# Patient Record
Sex: MALE | Race: WHITE | Employment: FULL TIME | URBAN - NONMETROPOLITAN AREA
[De-identification: names, ages, dates, MRNs, and addresses within clinical notes are randomized per-mention and may not be internally consistent; named-entity substitution may affect disease eponyms.]

---

## 2018-01-05 ENCOUNTER — OFFICE VISIT (OUTPATIENT)
Dept: FAMILY MEDICINE CLINIC | Age: 28
End: 2018-01-05
Payer: COMMERCIAL

## 2018-01-05 VITALS
BODY MASS INDEX: 39.54 KG/M2 | HEART RATE: 80 BPM | OXYGEN SATURATION: 98 % | HEIGHT: 70 IN | DIASTOLIC BLOOD PRESSURE: 100 MMHG | WEIGHT: 276.2 LBS | SYSTOLIC BLOOD PRESSURE: 142 MMHG | RESPIRATION RATE: 12 BRPM

## 2018-01-05 DIAGNOSIS — M25.512 CHRONIC LEFT SHOULDER PAIN: ICD-10-CM

## 2018-01-05 DIAGNOSIS — G89.29 CHRONIC LEFT SHOULDER PAIN: ICD-10-CM

## 2018-01-05 DIAGNOSIS — E66.9 OBESITY (BMI 35.0-39.9 WITHOUT COMORBIDITY): ICD-10-CM

## 2018-01-05 DIAGNOSIS — R53.83 FATIGUE, UNSPECIFIED TYPE: ICD-10-CM

## 2018-01-05 DIAGNOSIS — R03.0 ELEVATED BLOOD-PRESSURE READING WITHOUT DIAGNOSIS OF HYPERTENSION: ICD-10-CM

## 2018-01-05 DIAGNOSIS — R00.2 PALPITATIONS: Primary | ICD-10-CM

## 2018-01-05 DIAGNOSIS — L73.9 FOLLICULITIS: ICD-10-CM

## 2018-01-05 DIAGNOSIS — Z72.0 TOBACCO ABUSE: ICD-10-CM

## 2018-01-05 PROCEDURE — 36415 COLL VENOUS BLD VENIPUNCTURE: CPT | Performed by: NURSE PRACTITIONER

## 2018-01-05 PROCEDURE — 99205 OFFICE O/P NEW HI 60 MIN: CPT | Performed by: NURSE PRACTITIONER

## 2018-01-05 PROCEDURE — 93000 ELECTROCARDIOGRAM COMPLETE: CPT | Performed by: NURSE PRACTITIONER

## 2018-01-05 RX ORDER — METHYLPREDNISOLONE 4 MG/1
TABLET ORAL
Qty: 1 KIT | Refills: 0 | Status: SHIPPED | OUTPATIENT
Start: 2018-01-05 | End: 2018-01-11

## 2018-01-05 RX ORDER — CEPHALEXIN 500 MG/1
500 CAPSULE ORAL 3 TIMES DAILY
Qty: 21 CAPSULE | Refills: 0 | Status: SHIPPED | OUTPATIENT
Start: 2018-01-05 | End: 2018-01-12

## 2018-01-05 ASSESSMENT — PATIENT HEALTH QUESTIONNAIRE - PHQ9
SUM OF ALL RESPONSES TO PHQ QUESTIONS 1-9: 0
SUM OF ALL RESPONSES TO PHQ9 QUESTIONS 1 & 2: 0
2. FEELING DOWN, DEPRESSED OR HOPELESS: 0
1. LITTLE INTEREST OR PLEASURE IN DOING THINGS: 0

## 2018-01-05 ASSESSMENT — ENCOUNTER SYMPTOMS
VISUAL CHANGE: 0
VOMITING: 0
SWOLLEN GLANDS: 0
SORE THROAT: 0
NAUSEA: 0
ABDOMINAL PAIN: 0
COUGH: 0

## 2018-01-05 NOTE — PATIENT INSTRUCTIONS
Patient Education        Body Mass Index: Care Instructions  Your Care Instructions    Body mass index (BMI) can help you see if your weight is raising your risk for health problems. It uses a formula to compare how much you weigh with how tall you are. · A BMI lower than 18.5 is considered underweight. · A BMI between 18.5 and 24.9 is considered healthy. · A BMI between 25 and 29.9 is considered overweight. A BMI of 30 or higher is considered obese. If your BMI is in the normal range, it means that you have a lower risk for weight-related health problems. If your BMI is in the overweight or obese range, you may be at increased risk for weight-related health problems, such as high blood pressure, heart disease, stroke, arthritis or joint pain, and diabetes. If your BMI is in the underweight range, you may be at increased risk for health problems such as fatigue, lower protection (immunity) against illness, muscle loss, bone loss, hair loss, and hormone problems. BMI is just one measure of your risk for weight-related health problems. You may be at higher risk for health problems if you are not active, you eat an unhealthy diet, or you drink too much alcohol or use tobacco products. Follow-up care is a key part of your treatment and safety. Be sure to make and go to all appointments, and call your doctor if you are having problems. It's also a good idea to know your test results and keep a list of the medicines you take. How can you care for yourself at home? · Practice healthy eating habits. This includes eating plenty of fruits, vegetables, whole grains, lean protein, and low-fat dairy. · If your doctor recommends it, get more exercise. Walking is a good choice. Bit by bit, increase the amount you walk every day. Try for at least 30 minutes on most days of the week. · Do not smoke. Smoking can increase your risk for health problems.  If you need help quitting, talk to your doctor about stop-smoking

## 2018-01-05 NOTE — PROGRESS NOTES
[facility]  [facility]  [address] [place] [address]  Dept: [phone]  Dept Fax: [phone]  Loc: [phone]    Sameera Santana is a 32 y.o. male who presents today for his medical conditions/complaints as noted below. Chief Complaint   [facility]   [facility]    Shoulder Problem     L numbness x 3 months and getting worse    Fatigue    Chest Pain     x 3 months       HPI:     Does not know father. Early family history of CAD. Brother  of a stroke in his early 45s. He had MIs prior to this. Brother states that he was not Close with his brother because there is a 20 years difference so is unsure if he had any kind of drug use. Increased BMI. Does do a lot of lifting at work. Does not follow a certain diet or exercise routine. Is . States a year or two ago they started a weight loss program, but then stopped once it got cold out. Elevated  BP reading. States that he was first told this when he was 13. He does take his BP routinely. Gets 5-6 hours of sleep per night. .st      Shoulder Problem   This is a new problem. The current episode started more than 1 month ago. The problem occurs constantly. The problem has been waxing and waning. Associated symptoms include arthralgias, chest pain, fatigue, myalgias, numbness, vertigo and weakness. Pertinent negatives include no abdominal pain, anorexia, chills, congestion, coughing, diaphoresis, fever, headaches, joint swelling, nausea, neck pain, rash, sore throat, swollen glands, urinary symptoms, visual change or vomiting. Fatigue   Associated symptoms include arthralgias, chest pain, fatigue, myalgias, numbness, vertigo and weakness. Pertinent negatives include no abdominal pain, anorexia, chills, congestion, coughing, diaphoresis, fever, headaches, joint swelling, nausea, neck pain, rash, sore throat, swollen glands, urinary symptoms, visual change or vomiting.    Chest Pain Associated symptoms include numbness, palpitations and weakness. Pertinent negatives include no abdominal pain, cough, diaphoresis, fever, headaches, nausea or vomiting. Palpitations    This is a new problem. The current episode started more than 1 month ago. The problem occurs 2 to 4 times per day. The problem has been unchanged. Nothing (drinks one soda, and one coffee per day, denies feeling stressed ) aggravates the symptoms. Associated symptoms include chest pain, numbness and weakness. Pertinent negatives include no coughing, diaphoresis, fever, nausea or vomiting. Medications:    Current Outpatient Prescriptions:     methylPREDNISolone (MEDROL DOSEPACK) 4 MG tablet, Take by mouth., Disp: 1 kit, Rfl: 0    The patient is allergic to codeine. Past Medical History  Josef Lehman  has no past medical history on file. Past Surgical History  The patient  has a past surgical history that includes Leg Surgery (Right). Family History  This patient's family history includes Cancer in his father; Heart Disease in his brother, father, and mother; Other in his mother; Stroke in his brother and sister. Social History  Josef Lehman  reports that he has been smoking. He has never used smokeless tobacco. He reports that he does not drink alcohol or use drugs. Health Maintenance  Health Maintenance Due   Topic Date Due    HIV screen  12/16/2003    DTaP/Tdap/Td vaccine (1 - Tdap) 12/16/2007       Subjective:      Review of Systems   Constitutional: Positive for fatigue. Negative for chills, diaphoresis and fever. HENT: Negative for congestion and sore throat. Respiratory: Negative for cough. Cardiovascular: Positive for chest pain and palpitations. Gastrointestinal: Negative for abdominal pain, anorexia, nausea and vomiting. Musculoskeletal: Positive for arthralgias and myalgias. Negative for joint swelling and neck pain. Skin: Negative for rash.    Neurological: Positive for vertigo, weakness and

## 2018-01-08 ENCOUNTER — TELEPHONE (OUTPATIENT)
Dept: FAMILY MEDICINE CLINIC | Age: 28
End: 2018-01-08

## 2018-01-08 DIAGNOSIS — E78.6 LOW HDL (UNDER 40): Primary | ICD-10-CM

## 2018-01-08 DIAGNOSIS — E78.1 HIGH TRIGLYCERIDES: ICD-10-CM

## 2018-01-08 DIAGNOSIS — E88.81 DYSMETABOLIC SYNDROME: ICD-10-CM

## 2018-01-08 DIAGNOSIS — E55.9 VITAMIN D DEFICIENCY: ICD-10-CM

## 2018-01-08 RX ORDER — ERGOCALCIFEROL 1.25 MG/1
50000 CAPSULE ORAL WEEKLY
Qty: 12 CAPSULE | Refills: 0 | Status: SHIPPED | OUTPATIENT
Start: 2018-01-08

## 2018-01-08 RX ORDER — SIMVASTATIN 5 MG
5 TABLET ORAL NIGHTLY
Qty: 30 TABLET | Refills: 2 | Status: SHIPPED | OUTPATIENT
Start: 2018-01-08 | End: 2018-01-15 | Stop reason: SINTOL

## 2018-01-09 ENCOUNTER — HOSPITAL ENCOUNTER (OUTPATIENT)
Dept: NON INVASIVE DIAGNOSTICS | Age: 28
Discharge: HOME | End: 2018-01-09
Payer: COMMERCIAL

## 2018-01-09 DIAGNOSIS — R00.2 PALPITATIONS: ICD-10-CM

## 2018-01-09 PROCEDURE — 93226 XTRNL ECG REC<48 HR SCAN A/R: CPT

## 2018-01-09 PROCEDURE — 93225 XTRNL ECG REC<48 HRS REC: CPT

## 2018-01-15 ENCOUNTER — OFFICE VISIT (OUTPATIENT)
Dept: FAMILY MEDICINE CLINIC | Age: 28
End: 2018-01-15
Payer: COMMERCIAL

## 2018-01-15 VITALS
HEART RATE: 81 BPM | OXYGEN SATURATION: 97 % | WEIGHT: 276.2 LBS | DIASTOLIC BLOOD PRESSURE: 88 MMHG | RESPIRATION RATE: 16 BRPM | BODY MASS INDEX: 39.59 KG/M2 | SYSTOLIC BLOOD PRESSURE: 130 MMHG

## 2018-01-15 DIAGNOSIS — R00.2 PALPITATIONS: Primary | ICD-10-CM

## 2018-01-15 DIAGNOSIS — R20.0 NUMBNESS AND TINGLING IN LEFT ARM: ICD-10-CM

## 2018-01-15 DIAGNOSIS — E66.9 OBESITY (BMI 35.0-39.9 WITHOUT COMORBIDITY): ICD-10-CM

## 2018-01-15 DIAGNOSIS — E88.81 DYSMETABOLIC SYNDROME: ICD-10-CM

## 2018-01-15 DIAGNOSIS — E78.1 HIGH TRIGLYCERIDES: ICD-10-CM

## 2018-01-15 DIAGNOSIS — M25.512 ACUTE PAIN OF LEFT SHOULDER: ICD-10-CM

## 2018-01-15 DIAGNOSIS — Z23 NEED FOR PROPHYLACTIC VACCINATION AGAINST STREPTOCOCCUS PNEUMONIAE (PNEUMOCOCCUS): ICD-10-CM

## 2018-01-15 DIAGNOSIS — R20.2 NUMBNESS AND TINGLING IN LEFT ARM: ICD-10-CM

## 2018-01-15 DIAGNOSIS — Z82.49 FAMILY HISTORY OF EARLY CAD: ICD-10-CM

## 2018-01-15 DIAGNOSIS — Z72.0 TOBACCO ABUSE: ICD-10-CM

## 2018-01-15 PROCEDURE — 90732 PPSV23 VACC 2 YRS+ SUBQ/IM: CPT | Performed by: NURSE PRACTITIONER

## 2018-01-15 PROCEDURE — 90471 IMMUNIZATION ADMIN: CPT | Performed by: NURSE PRACTITIONER

## 2018-01-15 PROCEDURE — 99214 OFFICE O/P EST MOD 30 MIN: CPT | Performed by: NURSE PRACTITIONER

## 2018-01-22 ENCOUNTER — PROCEDURE VISIT (OUTPATIENT)
Dept: NEUROLOGY | Age: 28
End: 2018-01-22
Payer: COMMERCIAL

## 2018-01-22 DIAGNOSIS — R20.0 LEFT ARM NUMBNESS: ICD-10-CM

## 2018-01-22 DIAGNOSIS — M79.602 LEFT ARM PAIN: ICD-10-CM

## 2018-01-22 DIAGNOSIS — M54.12 CERVICAL RADICULOPATHY: Primary | ICD-10-CM

## 2018-01-22 PROCEDURE — 95909 NRV CNDJ TST 5-6 STUDIES: CPT | Performed by: PSYCHIATRY & NEUROLOGY

## 2018-01-22 PROCEDURE — 95886 MUSC TEST DONE W/N TEST COMP: CPT | Performed by: PSYCHIATRY & NEUROLOGY

## 2018-01-23 ASSESSMENT — ENCOUNTER SYMPTOMS
VOMITING: 0
SORE THROAT: 0
SWOLLEN GLANDS: 0
VISUAL CHANGE: 0
COUGH: 0
NAUSEA: 0
ABDOMINAL PAIN: 0

## 2018-01-23 NOTE — PROGRESS NOTES
symptoms, visual change or vomiting. Chest Pain    Associated symptoms include numbness, palpitations and weakness. Pertinent negatives include no abdominal pain, cough, diaphoresis, fever, headaches, nausea or vomiting. Palpitations    This is a new problem. The current episode started more than 1 month ago. The problem occurs 2 to 4 times per day. The problem has been unchanged. Nothing (drinks one soda, and one coffee per day, denies feeling stressed ) aggravates the symptoms. Associated symptoms include chest pain, numbness and weakness. Pertinent negatives include no coughing, diaphoresis, fever, nausea or vomiting. Medications:    Current Outpatient Prescriptions:     vitamin D (ERGOCALCIFEROL) 22989 units CAPS capsule, Take 1 capsule by mouth once a week, Disp: 12 capsule, Rfl: 0    The patient is allergic to codeine. Past Medical History  Carol Hensley  has no past medical history on file. Past Surgical History  The patient  has a past surgical history that includes Leg Surgery (Right). Family History  This patient's family history includes Cancer in his father; Heart Disease in his brother, father, and mother; Other in his mother; Stroke in his brother and sister. Social History  Carol Hensley  reports that he has been smoking. He has never used smokeless tobacco. He reports that he does not drink alcohol or use drugs. Health Maintenance  Health Maintenance Due   Topic Date Due    HIV screen  12/16/2003    DTaP/Tdap/Td vaccine (1 - Tdap) 12/16/2007       Subjective:      Review of Systems   Constitutional: Positive for fatigue. Negative for chills, diaphoresis and fever. HENT: Negative for congestion and sore throat. Respiratory: Negative for cough. Cardiovascular: Positive for chest pain and palpitations. Gastrointestinal: Negative for abdominal pain, anorexia, nausea and vomiting. Musculoskeletal: Positive for arthralgias and myalgias. Negative for joint swelling and neck pain. Skin: Negative for rash. Neurological: Positive for vertigo, weakness and numbness. Negative for headaches. Objective:     /88   Pulse 81   Resp 16   Wt 276 lb 3.2 oz (125.3 kg)   SpO2 97%   BMI 39.59 kg/m²     Physical Exam   Constitutional: He is oriented to person, place, and time. He appears well-developed and well-nourished. HENT:   Head: Normocephalic and atraumatic. Right Ear: External ear normal.   Left Ear: External ear normal.   Eyes: Conjunctivae and EOM are normal. Pupils are equal, round, and reactive to light. Right eye exhibits no discharge. Left eye exhibits no discharge. Neck: Trachea normal and normal range of motion. Neck supple. No spinous process tenderness present. No thyromegaly present. Cardiovascular: Normal rate, regular rhythm and normal heart sounds. Exam reveals no gallop and no friction rub. No murmur heard. Pulses:       Radial pulses are 2+ on the right side, and 2+ on the left side. Pulmonary/Chest: Effort normal and breath sounds normal.   Abdominal: Soft. Bowel sounds are normal. There is no tenderness. Musculoskeletal: Normal range of motion. Left shoulder: Normal.   Neurological: He is alert and oriented to person, place, and time. No cranial nerve deficit (3-12). Coordination and gait normal.   Skin: Skin is warm and dry. No rash noted. No erythema. Psychiatric: He has a normal mood and affect. His speech is normal and behavior is normal. Thought content normal.   Vitals reviewed.     Lab Results   Component Value Date     01/05/2018    K 4.5 01/05/2018     01/05/2018    CO2 24 01/05/2018    BUN 14 01/05/2018    CREATININE 0.8 01/05/2018    CALCIUM 9.8 01/05/2018    PROT 7.1 01/05/2018    LABALBU 4.5 01/05/2018    BILITOT 0.3 01/05/2018    ALKPHOS 60 01/05/2018    AST 22 01/05/2018    ALT 35 01/05/2018    LABGLOM >90 01/05/2018       Lab Results   Component Value Date    WBC 8.8 01/05/2018    HGB 16.5 01/05/2018    HCT 48.1 01/05/2018    MCV 87.7 01/05/2018     01/05/2018     Lab Results   Component Value Date    TSH 2.520 01/05/2018       Assessment/Plan:     Grisel Bernal was seen today for check-up and shoulder pain. Diagnoses and all orders for this visit:    Palpitations  -     (Gxt) Stress Test Exercise W Out Myoview; Future  -     Echocardiogram complete; Future    Obesity (BMI 35.0-39.9 without comorbidity)  -     (Gxt) Stress Test Exercise W Out Myoview; Future  -     Echocardiogram complete; Future    Dysmetabolic syndrome  -     (Gxt) Stress Test Exercise W Out Myoview; Future  -     Echocardiogram complete; Future    High triglycerides  -     (Gxt) Stress Test Exercise W Out Myoview; Future  -     Echocardiogram complete; Future    Tobacco abuse  -     (Gxt) Stress Test Exercise W Out Myoview; Future  -     Echocardiogram complete; Future    Family history of early CAD  -     (Gxt) Stress Test Exercise W Out Myoview; Future  -     Echocardiogram complete; Future    Need for prophylactic vaccination against Streptococcus pneumoniae (pneumococcus)  -     Pneumococcal polysaccharide vaccine 23-valent greater than or equal to 1yo subcutaneous/IM    Acute pain of left shoulder  -     XR SHOULDER LEFT (MIN 2 VIEWS); Future  -     XR SHOULDER LEFT (MIN 2 VIEWS)    Numbness and tingling in left arm  -     EMG; Future    If symptoms worsen advised to make sooner apt  Holter monitor reviewed with patient. Return in about 2 weeks (around 1/29/2018) for tobacco cessation, diagnostic fu, left shoulder pain. Discussed use, benefit, and side effects of prescribed medications. Barriers to medication compliance addressed. All patient questions answered. Pt voiced understanding.      Electronically signed by Lexie Riley CNP on 1/23/2018 at 8:42 AM

## 2018-01-24 ENCOUNTER — HOSPITAL ENCOUNTER (OUTPATIENT)
Dept: NON INVASIVE DIAGNOSTICS | Age: 28
Discharge: HOME | End: 2018-01-24

## 2018-01-24 DIAGNOSIS — E78.1 HIGH TRIGLYCERIDES: ICD-10-CM

## 2018-01-24 DIAGNOSIS — Z82.49 FAMILY HISTORY OF EARLY CAD: ICD-10-CM

## 2018-01-24 DIAGNOSIS — E66.9 OBESITY (BMI 35.0-39.9 WITHOUT COMORBIDITY): ICD-10-CM

## 2018-01-24 DIAGNOSIS — Z72.0 TOBACCO ABUSE: ICD-10-CM

## 2018-01-24 DIAGNOSIS — R00.2 PALPITATIONS: ICD-10-CM

## 2018-01-24 DIAGNOSIS — E88.81 DYSMETABOLIC SYNDROME: ICD-10-CM

## 2018-01-24 LAB
LV EF: 60 %
LVEF MODALITY: NORMAL

## 2018-01-24 PROCEDURE — 93306 TTE W/DOPPLER COMPLETE: CPT

## 2018-01-24 PROCEDURE — 93017 CV STRESS TEST TRACING ONLY: CPT

## 2018-01-25 DIAGNOSIS — Z82.49 FAMILY HISTORY OF EARLY CAD: Primary | ICD-10-CM

## 2018-01-25 DIAGNOSIS — R00.2 PALPITATIONS: ICD-10-CM

## 2018-01-29 ENCOUNTER — OFFICE VISIT (OUTPATIENT)
Dept: FAMILY MEDICINE CLINIC | Age: 28
End: 2018-01-29
Payer: COMMERCIAL

## 2018-01-29 VITALS
WEIGHT: 280.6 LBS | OXYGEN SATURATION: 97 % | SYSTOLIC BLOOD PRESSURE: 138 MMHG | HEART RATE: 76 BPM | BODY MASS INDEX: 40.22 KG/M2 | DIASTOLIC BLOOD PRESSURE: 74 MMHG | RESPIRATION RATE: 16 BRPM

## 2018-01-29 DIAGNOSIS — G89.29 CHRONIC LEFT SHOULDER PAIN: ICD-10-CM

## 2018-01-29 DIAGNOSIS — M25.512 CHRONIC LEFT SHOULDER PAIN: ICD-10-CM

## 2018-01-29 DIAGNOSIS — G56.02 CARPAL TUNNEL SYNDROME OF LEFT WRIST: ICD-10-CM

## 2018-01-29 DIAGNOSIS — M50.122 CERVICAL DISC DISORDER AT C5-C6 LEVEL WITH RADICULOPATHY: ICD-10-CM

## 2018-01-29 DIAGNOSIS — Z72.0 TOBACCO ABUSE: Primary | ICD-10-CM

## 2018-01-29 PROCEDURE — 99214 OFFICE O/P EST MOD 30 MIN: CPT | Performed by: NURSE PRACTITIONER

## 2018-01-29 RX ORDER — CYCLOBENZAPRINE HCL 10 MG
10 TABLET ORAL 3 TIMES DAILY PRN
Qty: 60 TABLET | Refills: 1 | Status: SHIPPED | OUTPATIENT
Start: 2018-01-29 | End: 2018-02-08

## 2018-01-29 ASSESSMENT — ENCOUNTER SYMPTOMS
NAUSEA: 0
VOMITING: 0
ABDOMINAL PAIN: 0
COUGH: 0
SORE THROAT: 0

## 2018-01-29 NOTE — PROGRESS NOTES
[facility]  [facility]  [address] [place] [address]  Dept: [phone]  Dept Fax: [phone]  Loc: [phone]    Pema Eden is a 32 y.o. male who presents today for his medical conditions/complaints as noted below. Chief Complaint   Patient presents with    Follow-up     has cut down smoking,     Shoulder Pain     discuss results from imaging, left shoulder        HPI:     Shoulder Problem   This is a new problem. The current episode started more than 1 month ago. The problem occurs constantly. The problem has been waxing and waning. Associated symptoms include arthralgias, chest pain, fatigue, myalgias, numbness, vertigo and weakness. Pertinent negatives include no abdominal pain, anorexia, chills, congestion, coughing, diaphoresis, fever, headaches, joint swelling, nausea, neck pain, rash, sore throat, swollen glands, urinary symptoms, visual change or vomiting. Did have EMG completed. Tobacco abuse. Has cut down to not smoking at all while at work. Is down to 1/4 pack a day. History of elevated BP. Palpitations. Early CAD in family. HAd echo and stress completed. Is schedule to see cardiology at the beginning of feb. Medications:    Current Outpatient Prescriptions:     cyclobenzaprine (FLEXERIL) 10 MG tablet, Take 1 tablet by mouth 3 times daily as needed for Muscle spasms, Disp: 60 tablet, Rfl: 1    vitamin D (ERGOCALCIFEROL) 47772 units CAPS capsule, Take 1 capsule by mouth once a week, Disp: 12 capsule, Rfl: 0    The patient is allergic to codeine. Past Medical History  Rashi Costa  has no past medical history on file. Past Surgical History  The patient  has a past surgical history that includes Leg Surgery (Right). Family History  This patient's family history includes Cancer in his father; Heart Disease in his brother, father, and mother; Other in his mother; Stroke in his brother and sister.     Social History  Rashi Costa  reports

## 2018-02-06 ENCOUNTER — TELEPHONE (OUTPATIENT)
Dept: CARDIOLOGY CLINIC | Age: 28
End: 2018-02-06

## 2018-02-06 ENCOUNTER — OFFICE VISIT (OUTPATIENT)
Dept: CARDIOLOGY CLINIC | Age: 28
End: 2018-02-06
Payer: COMMERCIAL

## 2018-02-06 VITALS
HEART RATE: 88 BPM | HEIGHT: 70 IN | DIASTOLIC BLOOD PRESSURE: 70 MMHG | BODY MASS INDEX: 39.43 KG/M2 | WEIGHT: 275.4 LBS | SYSTOLIC BLOOD PRESSURE: 118 MMHG

## 2018-02-06 DIAGNOSIS — R07.9 CHEST PAIN IN ADULT: Primary | ICD-10-CM

## 2018-02-06 DIAGNOSIS — E78.00 PURE HYPERCHOLESTEROLEMIA: ICD-10-CM

## 2018-02-06 DIAGNOSIS — K21.9 GASTROESOPHAGEAL REFLUX DISEASE, ESOPHAGITIS PRESENCE NOT SPECIFIED: ICD-10-CM

## 2018-02-06 DIAGNOSIS — Z82.49 FAMILY HISTORY OF PREMATURE CAD: ICD-10-CM

## 2018-02-06 DIAGNOSIS — R06.02 SOB (SHORTNESS OF BREATH) ON EXERTION: ICD-10-CM

## 2018-02-06 PROBLEM — I10 ESSENTIAL HYPERTENSION: Status: ACTIVE | Noted: 2018-02-06

## 2018-02-06 PROCEDURE — 99204 OFFICE O/P NEW MOD 45 MIN: CPT | Performed by: INTERNAL MEDICINE

## 2018-02-06 RX ORDER — ATORVASTATIN CALCIUM 10 MG/1
10 TABLET, FILM COATED ORAL DAILY
COMMUNITY
End: 2018-02-06 | Stop reason: SDUPTHER

## 2018-02-06 RX ORDER — ASPIRIN 81 MG/1
81 TABLET ORAL DAILY
Qty: 30 TABLET | Refills: 3 | COMMUNITY
Start: 2018-02-06

## 2018-02-06 RX ORDER — PANTOPRAZOLE SODIUM 40 MG/1
40 TABLET, DELAYED RELEASE ORAL DAILY
Qty: 30 TABLET | Refills: 0 | Status: SHIPPED | OUTPATIENT
Start: 2018-02-06

## 2018-02-06 RX ORDER — ATORVASTATIN CALCIUM 10 MG/1
10 TABLET, FILM COATED ORAL DAILY
Qty: 30 TABLET | Refills: 0 | Status: ON HOLD | OUTPATIENT
Start: 2018-02-06 | End: 2018-02-16

## 2018-02-06 NOTE — PROGRESS NOTES
status:      Spouse name: N/A    Number of children: N/A    Years of education: N/A     Occupational History    Not on file. Social History Main Topics    Smoking status: Current Every Day Smoker    Smokeless tobacco: Never Used    Alcohol use No    Drug use: No    Sexual activity: Not on file     Other Topics Concern    Not on file     Social History Narrative    No narrative on file       Current Outpatient Prescriptions   Medication Sig Dispense Refill    aspirin EC 81 MG EC tablet Take 1 tablet by mouth daily 30 tablet 3    metoprolol tartrate (LOPRESSOR) 25 MG tablet Take 0.5 tablets by mouth 2 times daily 60 tablet 3    pantoprazole (PROTONIX) 40 MG tablet Take 1 tablet by mouth daily 30 tablet 0    cyclobenzaprine (FLEXERIL) 10 MG tablet Take 1 tablet by mouth 3 times daily as needed for Muscle spasms 60 tablet 1    vitamin D (ERGOCALCIFEROL) 35608 units CAPS capsule Take 1 capsule by mouth once a week 12 capsule 0    atorvastatin (LIPITOR) 10 MG tablet Take 1 tablet by mouth daily 30 tablet 0     No current facility-administered medications for this visit. Review of Systems -     General ROS: negative  Psychological ROS: negative  Hematological and Lymphatic ROS: No history of blood clots or bleeding disorder. Respiratory ROS: no cough, shortness of breath, or wheezing  Cardiovascular ROS: no chest pain or dyspnea on exertion  Gastrointestinal ROS: negative  Genito-Urinary ROS: no dysuria, trouble voiding, or hematuria  Musculoskeletal ROS: negative  Neurological ROS: no TIA or stroke symptoms  Dermatological ROS: negative      Blood pressure 118/70, pulse 88, height 5' 10\" (1.778 m), weight 275 lb 6.4 oz (124.9 kg).         Physical Examination:    General appearance - alert, well appearing, and in no distress  Mental status - alert, oriented to person, place, and time  Neck - supple, no significant adenopathy, no JVD, or carotid bruits  Chest - clear to auscultation, no ischemia.      Signatures      ----------------------------------------------------------------   Electronically signed by Odalys Vickers MD (Performing   [id number]) on 01/24/2018 at 17:38    ekg NSR, no acute abn    Echo wnl      Assessment  Poor exercise tolerance on threadmill    1. Chest pain in adult  XR Cardiac Cath Procedure   2. SOB (shortness of breath) on exertion  XR Cardiac Cath Procedure   3. Pure hypercholesterolemia  XR Cardiac Cath Procedure   4. Family history of premature CAD  XR Cardiac Cath Procedure   5. Gastroesophageal reflux disease, esophagitis presence not specified  XR Cardiac Cath Procedure         Plan   Continue the current treatment and with constant vigilance to changes in symptoms and also any potential side effects. Return for care or seek medical attention immediately if symptoms got worse and/or develop new symptoms. Cp and sob on exertion for 3 months  Getting worse  ETT- poor exercise tolerance  Start asa 81  Start lopressor 12.5 po bid  Significant FHX of premature CAD  Need cardiac cath  The risk and benefit of left heart cath has been explain in detail including but not limited to  Bleeding including retroperitoneal bleed 1%, infection, MI, CVA, DESMOND, Limb loss, dissection, allergic reaction,death  Each of them 1 in 2000 range. The alternative managment has been explained. Patient expressed understanding of the risk and benefit and of the alternative managment well. Patient wanted and agreed to proceed with left heart cath.   Hence we will schedule him for [facility]    Hx of GERD  Start protonix    D/w the pat at length    RTC 3 weeks           [facility]

## 2018-02-14 RX ORDER — CYCLOBENZAPRINE HCL 10 MG
10 TABLET ORAL 2 TIMES DAILY PRN
COMMUNITY

## 2018-02-15 ENCOUNTER — HOSPITAL ENCOUNTER (OUTPATIENT)
Dept: MRI IMAGING | Age: 28
Discharge: HOME | End: 2018-02-15
Payer: COMMERCIAL

## 2018-02-15 DIAGNOSIS — M25.512 CHRONIC LEFT SHOULDER PAIN: ICD-10-CM

## 2018-02-15 DIAGNOSIS — G56.02 CARPAL TUNNEL SYNDROME OF LEFT WRIST: ICD-10-CM

## 2018-02-15 DIAGNOSIS — G89.29 CHRONIC LEFT SHOULDER PAIN: ICD-10-CM

## 2018-02-15 DIAGNOSIS — M50.122 CERVICAL DISC DISORDER AT C5-C6 LEVEL WITH RADICULOPATHY: ICD-10-CM

## 2018-02-15 PROCEDURE — 72141 MRI NECK SPINE W/O DYE: CPT

## 2018-02-16 ENCOUNTER — HOSPITAL ENCOUNTER (OUTPATIENT)
Dept: INPATIENT UNIT | Age: 28
Discharge: HOME | End: 2018-02-16
Attending: INTERNAL MEDICINE | Admitting: INTERNAL MEDICINE
Payer: COMMERCIAL

## 2018-02-16 ENCOUNTER — APPOINTMENT (OUTPATIENT)
Dept: CARDIAC CATH/INVASIVE PROCEDURES | Age: 28
End: 2018-02-16
Attending: INTERNAL MEDICINE
Payer: COMMERCIAL

## 2018-02-16 VITALS
BODY MASS INDEX: 39.37 KG/M2 | DIASTOLIC BLOOD PRESSURE: 83 MMHG | SYSTOLIC BLOOD PRESSURE: 129 MMHG | HEIGHT: 70 IN | WEIGHT: 275 LBS | RESPIRATION RATE: 19 BRPM | HEART RATE: 72 BPM | OXYGEN SATURATION: 96 % | TEMPERATURE: 98.2 F

## 2018-02-16 PROBLEM — Z98.890 S/P CARDIAC CATH: Status: ACTIVE | Noted: 2018-02-16

## 2018-02-16 LAB
ABO: NORMAL
ANION GAP SERPL CALCULATED.3IONS-SCNC: 14 MEQ/L (ref 8–16)
ANTIBODY SCREEN: NORMAL
BUN BLDV-MCNC: 12 MG/DL (ref 7–22)
CALCIUM SERPL-MCNC: 9.5 MG/DL (ref 8.5–10.5)
CHLORIDE BLD-SCNC: 102 MEQ/L (ref 98–111)
CO2: 22 MEQ/L (ref 23–33)
CREAT SERPL-MCNC: 0.6 MG/DL (ref 0.4–1.2)
GFR SERPL CREATININE-BSD FRML MDRD: > 90 ML/MIN/1.73M2
GLUCOSE BLD-MCNC: 91 MG/DL (ref 70–108)
HCT VFR BLD CALC: 46.2 % (ref 42–52)
HEMOGLOBIN: 16.1 GM/DL (ref 14–18)
INR BLD: 0.94 (ref 0.85–1.13)
MCH RBC QN AUTO: 31.1 PG (ref 27–31)
MCHC RBC AUTO-ENTMCNC: 34.8 GM/DL (ref 33–37)
MCV RBC AUTO: 89.3 FL (ref 80–94)
PDW BLD-RTO: 12.6 % (ref 11.5–14.5)
PLATELET # BLD: 255 THOU/MM3 (ref 130–400)
PMV BLD AUTO: 8.7 FL (ref 7.4–10.4)
POTASSIUM SERPL-SCNC: 4.3 MEQ/L (ref 3.5–5.2)
RBC # BLD: 5.17 MILL/MM3 (ref 4.7–6.1)
RH FACTOR: NORMAL
SODIUM BLD-SCNC: 138 MEQ/L (ref 135–145)
WBC # BLD: 7.2 THOU/MM3 (ref 4.8–10.8)

## 2018-02-16 PROCEDURE — 2500000003 HC RX 250 WO HCPCS

## 2018-02-16 PROCEDURE — 6360000002 HC RX W HCPCS

## 2018-02-16 PROCEDURE — 86900 BLOOD TYPING SEROLOGIC ABO: CPT

## 2018-02-16 PROCEDURE — 36415 COLL VENOUS BLD VENIPUNCTURE: CPT

## 2018-02-16 PROCEDURE — C1725 CATH, TRANSLUMIN NON-LASER: HCPCS

## 2018-02-16 PROCEDURE — 86850 RBC ANTIBODY SCREEN: CPT

## 2018-02-16 PROCEDURE — C1769 GUIDE WIRE: HCPCS

## 2018-02-16 PROCEDURE — 86901 BLOOD TYPING SEROLOGIC RH(D): CPT

## 2018-02-16 PROCEDURE — 85610 PROTHROMBIN TIME: CPT

## 2018-02-16 PROCEDURE — 2580000003 HC RX 258: Performed by: INTERNAL MEDICINE

## 2018-02-16 PROCEDURE — 93458 L HRT ARTERY/VENTRICLE ANGIO: CPT | Performed by: INTERNAL MEDICINE

## 2018-02-16 PROCEDURE — 2780000010 HC IMPLANT OTHER

## 2018-02-16 PROCEDURE — 93005 ELECTROCARDIOGRAM TRACING: CPT | Performed by: INTERNAL MEDICINE

## 2018-02-16 PROCEDURE — 85027 COMPLETE CBC AUTOMATED: CPT

## 2018-02-16 PROCEDURE — C1894 INTRO/SHEATH, NON-LASER: HCPCS

## 2018-02-16 PROCEDURE — 80048 BASIC METABOLIC PNL TOTAL CA: CPT

## 2018-02-16 RX ORDER — ACETAMINOPHEN 325 MG/1
650 TABLET ORAL EVERY 4 HOURS PRN
Status: DISCONTINUED | OUTPATIENT
Start: 2018-02-16 | End: 2018-02-17 | Stop reason: HOSPADM

## 2018-02-16 RX ORDER — SODIUM CHLORIDE 9 MG/ML
INJECTION, SOLUTION INTRAVENOUS CONTINUOUS
Status: DISCONTINUED | OUTPATIENT
Start: 2018-02-16 | End: 2018-02-17 | Stop reason: HOSPADM

## 2018-02-16 RX ORDER — SODIUM CHLORIDE 0.9 % (FLUSH) 0.9 %
10 SYRINGE (ML) INJECTION PRN
Status: DISCONTINUED | OUTPATIENT
Start: 2018-02-16 | End: 2018-02-16 | Stop reason: SDUPTHER

## 2018-02-16 RX ORDER — ALPRAZOLAM 0.5 MG/1
0.5 TABLET ORAL
Status: ACTIVE | OUTPATIENT
Start: 2018-02-16 | End: 2018-02-16

## 2018-02-16 RX ORDER — ONDANSETRON 2 MG/ML
4 INJECTION INTRAMUSCULAR; INTRAVENOUS EVERY 6 HOURS PRN
Status: DISCONTINUED | OUTPATIENT
Start: 2018-02-16 | End: 2018-02-17 | Stop reason: HOSPADM

## 2018-02-16 RX ORDER — ATORVASTATIN CALCIUM 20 MG/1
20 TABLET, FILM COATED ORAL DAILY
Qty: 30 TABLET | Refills: 5 | Status: SHIPPED | OUTPATIENT
Start: 2018-02-16

## 2018-02-16 RX ORDER — SODIUM CHLORIDE 0.9 % (FLUSH) 0.9 %
10 SYRINGE (ML) INJECTION EVERY 12 HOURS SCHEDULED
Status: DISCONTINUED | OUTPATIENT
Start: 2018-02-16 | End: 2018-02-17 | Stop reason: HOSPADM

## 2018-02-16 RX ORDER — SODIUM CHLORIDE 0.9 % (FLUSH) 0.9 %
10 SYRINGE (ML) INJECTION PRN
Status: DISCONTINUED | OUTPATIENT
Start: 2018-02-16 | End: 2018-02-17 | Stop reason: HOSPADM

## 2018-02-16 RX ORDER — SODIUM CHLORIDE 0.9 % (FLUSH) 0.9 %
10 SYRINGE (ML) INJECTION EVERY 12 HOURS SCHEDULED
Status: DISCONTINUED | OUTPATIENT
Start: 2018-02-16 | End: 2018-02-16 | Stop reason: SDUPTHER

## 2018-02-16 RX ADMIN — SODIUM CHLORIDE: 9 INJECTION, SOLUTION INTRAVENOUS at 12:19

## 2018-02-16 ASSESSMENT — PAIN SCALES - GENERAL
PAINLEVEL_OUTOF10: 0

## 2018-02-16 NOTE — PROGRESS NOTES
Pt admitted to   17 ambulatory for cardiac cath. Pt NPO. Patient accompanied by spouse. Vital signs obtained. Assessment and data collection initiated. Oriented to room. Policies and procedures for 2E explained   All questions answered with no further questions at this time. Fall prevention and safety precautions discussed with patient. Reviewed with the patient the planned procedure, cardiac cath, and patient verbalized the understanding. Questions and concerns addressed. Care plan reviewed with patient and spouse. Patient and spouse verbalize understanding of the plan of care and contribute to goal setting. Home medications with Patient. Patient and spouse instructed to not take any home medications without first checking with staff.

## 2018-02-16 NOTE — PRE SEDATION
Solitario Aman) tablet 0.5 mg, 0.5 mg, Oral, Once PRN, Thurman Goltz, MD  Prior to Admission medications    Medication Sig Start Date End Date Taking? Authorizing Provider   cyclobenzaprine (FLEXERIL) 10 MG tablet Take 10 mg by mouth 2 times daily as needed for Muscle spasms   Yes Historical Provider, MD   metoprolol tartrate (LOPRESSOR) 12.5 mg TABS Take 12.5 mg by mouth 2 times daily   Yes Historical Provider, MD   aspirin EC 81 MG EC tablet Take 1 tablet by mouth daily 2/6/18  Yes Thurman Goltz, MD   pantoprazole (PROTONIX) 40 MG tablet Take 1 tablet by mouth daily 2/6/18  Yes Thurman Goltz, MD   atorvastatin (LIPITOR) 10 MG tablet Take 1 tablet by mouth daily 2/6/18  Yes Bridgette Carmona CNP   vitamin D (ERGOCALCIFEROL) 89326 units CAPS capsule Take 1 capsule by mouth once a week 1/8/18  Yes Nilda Hanna CNP     Additional information:       VITAL SIGNS   Vitals:    02/16/18 1215   BP: 131/88   Pulse: 87   Resp: 16   Temp: 97.7 °F (36.5 °C)   SpO2: 97%       PHYSICAL:   Heart:  [x]Regular rate and rhythm  []Other:    Lungs:  [x]Clear    []Other:    Abdomen: [x]Soft    []Other:    Mental Status: [x]Alert & Oriented  []Other:      PLANNED PROCEDURE   [x]Cath  []PCI                []Pacemaker/AICD  []ANATOLIY             []Cardioversion []Peripheral angiography/PTA  []Other:      SEDATION  Planned agent:[x]Midazolam []Meperidine [x]Sublimaze []Morphine  []Diazepam  []Other:     ASA Classification:  []1 []2 [x]3 []4 []5  Class 1: A normal healthy patient  Class 2: Pt with mild to moderate systemic disease  Class 3: Severe systemic disease or disturbance  Class 4: Severe systemic disorders that are already life threatening. Class 5: Moribund pt with little chances of survival, for more than 24 hours. Mallampati I Airway Classification:   []1 [x]2 []3 []4    [x]Pre-procedure diagnostic studies complete and results available. Comment:    [x]Previous sedation/anesthesia experiences assessed.    Comment:    [x]The

## 2018-02-17 NOTE — PROGRESS NOTES
Returned to room from cath lab per bed. Assumed care from cath lab staff. Patient awake, alert, and oriented. IV 0.9 NS infusing. Right radial site intact with vasc band, armboard, and pillow support. No swelling, firmness, or drainage noted. See post procedure flow sheet.

## 2018-02-19 LAB
EKG ATRIAL RATE: 70 BPM
EKG P AXIS: 49 DEGREES
EKG P-R INTERVAL: 148 MS
EKG Q-T INTERVAL: 364 MS
EKG QRS DURATION: 90 MS
EKG QTC CALCULATION (BAZETT): 393 MS
EKG R AXIS: 38 DEGREES
EKG T AXIS: 19 DEGREES
EKG VENTRICULAR RATE: 70 BPM

## 2018-03-08 ENCOUNTER — TELEPHONE (OUTPATIENT)
Dept: CARDIOLOGY CLINIC | Age: 28
End: 2018-03-08

## 2018-08-08 ENCOUNTER — OFFICE VISIT (OUTPATIENT)
Dept: FAMILY MEDICINE CLINIC | Age: 28
End: 2018-08-08
Payer: COMMERCIAL

## 2018-08-08 VITALS
BODY MASS INDEX: 39.71 KG/M2 | HEART RATE: 91 BPM | HEIGHT: 70 IN | SYSTOLIC BLOOD PRESSURE: 128 MMHG | DIASTOLIC BLOOD PRESSURE: 88 MMHG | WEIGHT: 277.4 LBS | OXYGEN SATURATION: 99 % | RESPIRATION RATE: 16 BRPM

## 2018-08-08 DIAGNOSIS — M25.512 CHRONIC LEFT SHOULDER PAIN: ICD-10-CM

## 2018-08-08 DIAGNOSIS — G89.29 CHRONIC LEFT SHOULDER PAIN: ICD-10-CM

## 2018-08-08 DIAGNOSIS — I10 ESSENTIAL HYPERTENSION: ICD-10-CM

## 2018-08-08 DIAGNOSIS — G56.02 MILD CARPAL TUNNEL SYNDROME, LEFT: Primary | ICD-10-CM

## 2018-08-08 PROBLEM — R03.0 ELEVATED BLOOD-PRESSURE READING WITHOUT DIAGNOSIS OF HYPERTENSION: Status: RESOLVED | Noted: 2018-01-05 | Resolved: 2018-08-08

## 2018-08-08 PROCEDURE — 99214 OFFICE O/P EST MOD 30 MIN: CPT | Performed by: NURSE PRACTITIONER

## 2018-08-08 RX ORDER — METHYLPREDNISOLONE 4 MG/1
TABLET ORAL
Qty: 1 KIT | Refills: 0 | Status: SHIPPED | OUTPATIENT
Start: 2018-08-08 | End: 2018-08-14

## 2018-08-08 ASSESSMENT — ENCOUNTER SYMPTOMS
COUGH: 0
NAUSEA: 0
ABDOMINAL PAIN: 0
SORE THROAT: 0
VOMITING: 0

## 2018-08-08 NOTE — PROGRESS NOTES
sounds normal.   Abdominal: Soft. Bowel sounds are normal. There is no tenderness. Musculoskeletal: Normal range of motion. Left shoulder: He exhibits spasm. He exhibits normal range of motion, no tenderness, no bony tenderness, no swelling, no effusion, no crepitus, no deformity, no laceration, no pain, normal pulse and normal strength. Cervical back: He exhibits spasm. He exhibits normal range of motion, no tenderness, no bony tenderness, no swelling, no edema, no deformity, no pain and normal pulse. Negative phalen and tinel. Right hand grasp slightly stronger than left. Neurological: He is alert and oriented to person, place, and time. No cranial nerve deficit (3-12). Coordination and gait normal.   Skin: Skin is warm and dry. No rash noted. No erythema. Psychiatric: He has a normal mood and affect. His speech is normal and behavior is normal. Thought content normal.   Vitals reviewed. Narrative   PROCEDURE: XR SHOULDER LEFT STANDARD       CLINICAL INFORMATION: Acute left shoulder pain.       COMPARISON: No prior study.       TECHNIQUE: AP, Grashey, axillary and transscapular Y views performed.           FINDINGS:    POSTOPERATIVE CHANGES: None.       ALIGNMENT: Anatomic.       MINERALIZATION: Normal.       FRACTURE: None.       ACROMIOCLAVICULAR ARTICULATION: Unremarkable.       GLENOHUMERAL ARTICULATION: Unremarkable.       ACROMIOHUMERAL INTERVAL: Unremarkable.       RIBS:  No rib abnormality is seen within the imaged portions of the chest.       OTHER: None.               Impression   1. Unremarkable shoulder series.               **This report has been created using voice recognition software.  It may contain minor errors which are inherent in voice recognition technology. **       Final report electronically signed by Dr. Isabela Echevarria on 1/15/2018 11:03 AM     Narrative   PROCEDURE: MRI CERVICAL SPINE WO CONTRAST       CLINICAL INFORMATION: Chronic left shoulder pain, Chronic left shoulder pain, Carpal tunnel syndrome of left wrist, Cervical disc disorder at C5-C6 level with radiculopathy . Left shoulder numbness and tingling for 4 months.       COMPARISON: No prior study.       TECHNIQUE: Sagittal T1, T2 and STIR sequences were obtained through the cervical spine. Axial fast and echo and gradient echo T2-weighted images were obtained.       FINDINGS:               The cervical vertebral bodies are normally aligned. There is normal marrow signal throughout. No suspicious osseous lesions are present.  The facet joints are normally aligned.       The cervical spinal cord is of normal caliber and signal intensity.  The visualized aspects of the posterior fossa are normal.       On the axial images, at C2-C3, there are no degenerative changes. There is no spinal canal or foraminal stenosis.       At C3-C4, there are mild left-sided uncovertebral joint degenerative changes. There is no spinal canal stenosis or foraminal stenosis.       At C4-C5, there are mild facet degenerative changes. There is a small central posterior disc osteophyte complex. There is mild spinal canal stenosis. There is no foraminal stenosis.       At C5-C6, there is a small posterior disc osteophyte complex. There are mild facet degenerative changes. There is mild spinal canal stenosis. There is no foraminal stenosis.       At C6-C7, there is a small posterior disc osteophyte complex with mild spinal canal stenosis. There is no foraminal stenosis.       At C7-T1, there is no spinal canal or foraminal stenosis.       There are no suspicious findings in the cervical soft tissues.           Impression           1. Mild spinal canal stenosis at the C4-5, C5-6 and C6-7 levels due to posterior disc osteophyte complexes. 2. No foraminal stenosis is noted.                     Assessment/Plan:     Charlee Rizvi was seen today for shoulder pain and arm pain.     Diagnoses and all orders for this visit:    Mild carpal tunnel syndrome, left  -     Amb External Referral To Orthopedic Surgery  -     methylPREDNISolone (MEDROL DOSEPACK) 4 MG tablet; Take by mouth. Discussed importance of correcting, especially because now he is loosing  and causing additional problems  Chronic left shoulder pain   If does not resolved after carpal tunnel surgery, will make an apt and consider physical therapy   Essential hypertension   Stable, but not at goal   Likely can be from shoulder pain   Keep fu with cardiology   Continue current meds   DASH diet   Goal is below 120/80      Return in about 6 months (around 2/8/2019) for Anual exam.    Discussed use, benefit, and side effects of prescribed medications. Barriers to medication compliance addressed. All patient questions answered. Pt voiced understanding.      Electronically signed by NANCY Connors CNP on 8/8/2018 at 8:52 AM

## 2018-08-10 LAB
ALBUMIN SERPL-MCNC: 4.1 G/DL
ALP BLD-CCNC: 55 U/L
ALT SERPL-CCNC: 29 U/L
ANION GAP SERPL CALCULATED.3IONS-SCNC: NORMAL MMOL/L
AST SERPL-CCNC: 119 U/L
BASOPHILS ABSOLUTE: 30 /ΜL
BASOPHILS RELATIVE PERCENT: 0.3 %
BILIRUB SERPL-MCNC: 0.3 MG/DL (ref 0.1–1.4)
BUN BLDV-MCNC: NORMAL MG/DL
CALCIUM SERPL-MCNC: 9.2 MG/DL
CHLORIDE BLD-SCNC: 103 MMOL/L
CHOLESTEROL, TOTAL: 261 MG/DL
CHOLESTEROL/HDL RATIO: 7.1
CO2: 26 MMOL/L
CREAT SERPL-MCNC: NORMAL MG/DL
CREATININE: 0.8 MG/DL
EOSINOPHILS ABSOLUTE: 99 /ΜL
EOSINOPHILS RELATIVE PERCENT: 1 %
GFR CALCULATED: NORMAL
GLUCOSE BLD-MCNC: 80 MG/DL
GLUCOSE BLD-MCNC: NORMAL MG/DL
HCT VFR BLD CALC: 46.2 % (ref 41–53)
HDLC SERPL-MCNC: 37 MG/DL (ref 35–70)
HEMOGLOBIN: 15.6 G/DL (ref 13.5–17.5)
IRON: 57
LDL CHOLESTEROL CALCULATED: ABNORMAL MG/DL (ref 0–160)
LYMPHOCYTES ABSOLUTE: 3376 /ΜL
LYMPHOCYTES RELATIVE PERCENT: 34.1 %
MCH RBC QN AUTO: 30.2 PG
MCHC RBC AUTO-ENTMCNC: 33.8 G/DL
MCV RBC AUTO: 89.5 FL
MONOCYTES ABSOLUTE: 693 /ΜL
MONOCYTES RELATIVE PERCENT: 7 %
NEUTROPHILS ABSOLUTE: 5702 /ΜL
NEUTROPHILS RELATIVE PERCENT: 57.6 %
PDW BLD-RTO: 12.2 %
PHOSPHORUS: 3.7 MG/DL
PLATELET # BLD: 312 K/ΜL
PMV BLD AUTO: 10.4 FL
POTASSIUM SERPL-SCNC: 4.3 MMOL/L
RBC # BLD: 5.16 10^6/ΜL
SODIUM BLD-SCNC: 139 MMOL/L
TOTAL PROTEIN: 6.7
TRIGL SERPL-MCNC: 424 MG/DL
URIC ACID: 7.1
VLDLC SERPL CALC-MCNC: ABNORMAL MG/DL
WBC # BLD: 9.9 10^3/ML

## 2018-10-25 ENCOUNTER — OFFICE VISIT (OUTPATIENT)
Dept: FAMILY MEDICINE CLINIC | Age: 28
End: 2018-10-25
Payer: COMMERCIAL

## 2018-10-25 VITALS
BODY MASS INDEX: 40.12 KG/M2 | WEIGHT: 279.6 LBS | DIASTOLIC BLOOD PRESSURE: 98 MMHG | OXYGEN SATURATION: 98 % | HEART RATE: 98 BPM | RESPIRATION RATE: 16 BRPM | SYSTOLIC BLOOD PRESSURE: 138 MMHG

## 2018-10-25 DIAGNOSIS — R06.02 SHORTNESS OF BREATH: ICD-10-CM

## 2018-10-25 DIAGNOSIS — J40 BRONCHITIS: Primary | ICD-10-CM

## 2018-10-25 DIAGNOSIS — Z72.0 TOBACCO ABUSE: ICD-10-CM

## 2018-10-25 PROCEDURE — 99213 OFFICE O/P EST LOW 20 MIN: CPT | Performed by: NURSE PRACTITIONER

## 2018-10-25 PROCEDURE — 96372 THER/PROPH/DIAG INJ SC/IM: CPT | Performed by: NURSE PRACTITIONER

## 2018-10-25 RX ORDER — METHYLPREDNISOLONE ACETATE 80 MG/ML
160 INJECTION, SUSPENSION INTRA-ARTICULAR; INTRALESIONAL; INTRAMUSCULAR; SOFT TISSUE ONCE
Status: COMPLETED | OUTPATIENT
Start: 2018-10-25 | End: 2018-10-25

## 2018-10-25 RX ORDER — LEVOFLOXACIN 750 MG/1
750 TABLET ORAL DAILY
Qty: 7 TABLET | Refills: 0 | Status: SHIPPED | OUTPATIENT
Start: 2018-10-25 | End: 2018-11-01

## 2018-10-25 RX ADMIN — METHYLPREDNISOLONE ACETATE 160 MG: 80 INJECTION, SUSPENSION INTRA-ARTICULAR; INTRALESIONAL; INTRAMUSCULAR; SOFT TISSUE at 10:25

## 2018-10-25 ASSESSMENT — ENCOUNTER SYMPTOMS
TROUBLE SWALLOWING: 0
SORE THROAT: 0
BACK PAIN: 0
WHEEZING: 1
CHEST TIGHTNESS: 1
ABDOMINAL PAIN: 0
COLOR CHANGE: 0
VOMITING: 0
EYE ITCHING: 0
ORTHOPNEA: 1
EYE REDNESS: 0
CONSTIPATION: 0
SHORTNESS OF BREATH: 1
PHOTOPHOBIA: 0
SINUS PAIN: 0
DIARRHEA: 0
EYE PAIN: 0
EYE DISCHARGE: 0
COUGH: 1
SINUS PRESSURE: 0
BLOOD IN STOOL: 0
RHINORRHEA: 0
NAUSEA: 0
ABDOMINAL DISTENTION: 0

## 2018-10-25 NOTE — PROGRESS NOTES
After obtaining consent, and per orders of Karen Cavazos CNP, injection of 2 mL depo-medrol 80 mg/ml given in Right deltoid by Lisa Sidhu. Patient instructed to  report any adverse reaction to me immediately. Administrations This Visit     methylPREDNISolone acetate (DEPO-MEDROL) injection 160 mg     Admin Date  10/25/2018  10:25 Action  Given Dose  160 mg Route  Intramuscular Site  Deltoid Right Administered By  Lisa Sidhu MA    Ordering Provider:  NANCY Cisneros CNP    NDC:  0653-4797-87    Lot#:  [id number]    :  Gabe Hamilton. Patient Supplied?:  No                    Pt tolerated well.

## 2019-05-01 ENCOUNTER — OFFICE VISIT (OUTPATIENT)
Dept: FAMILY MEDICINE CLINIC | Age: 29
End: 2019-05-01
Payer: COMMERCIAL

## 2019-05-01 VITALS
RESPIRATION RATE: 16 BRPM | HEART RATE: 96 BPM | SYSTOLIC BLOOD PRESSURE: 124 MMHG | DIASTOLIC BLOOD PRESSURE: 82 MMHG | OXYGEN SATURATION: 97 % | BODY MASS INDEX: 40.61 KG/M2 | WEIGHT: 283 LBS

## 2019-05-01 DIAGNOSIS — R31.9 HEMATURIA, UNSPECIFIED TYPE: ICD-10-CM

## 2019-05-01 DIAGNOSIS — R10.9 BILATERAL FLANK PAIN: ICD-10-CM

## 2019-05-01 DIAGNOSIS — Z20.2 POSSIBLE EXPOSURE TO STD: ICD-10-CM

## 2019-05-01 DIAGNOSIS — N30.01 ACUTE CYSTITIS WITH HEMATURIA: Primary | ICD-10-CM

## 2019-05-01 LAB
BILIRUBIN URINE: NEGATIVE
BLOOD URINE, POC: NORMAL
CHARACTER, URINE: NORMAL
COLOR, URINE: YELLOW
GLUCOSE URINE: NEGATIVE MG/DL
KETONES, URINE: NEGATIVE
LEUKOCYTE CLUMPS, URINE: NEGATIVE
NITRITE, URINE: NEGATIVE
PH, URINE: 6 (ref 5–9)
PROTEIN, URINE: NEGATIVE MG/DL
SPECIFIC GRAVITY, URINE: >= 1.03 (ref 1–1.03)
UROBILINOGEN, URINE: 0.2 EU/DL (ref 0–1)

## 2019-05-01 PROCEDURE — 81003 URINALYSIS AUTO W/O SCOPE: CPT | Performed by: NURSE PRACTITIONER

## 2019-05-01 PROCEDURE — 99213 OFFICE O/P EST LOW 20 MIN: CPT | Performed by: NURSE PRACTITIONER

## 2019-05-01 PROCEDURE — 36415 COLL VENOUS BLD VENIPUNCTURE: CPT | Performed by: NURSE PRACTITIONER

## 2019-05-01 RX ORDER — FLUCONAZOLE 100 MG/1
100 TABLET ORAL DAILY
Qty: 3 TABLET | Refills: 0 | Status: SHIPPED | OUTPATIENT
Start: 2019-05-01 | End: 2019-05-04

## 2019-05-01 RX ORDER — CIPROFLOXACIN 500 MG/1
500 TABLET, FILM COATED ORAL 2 TIMES DAILY
Qty: 14 TABLET | Refills: 0 | Status: SHIPPED | OUTPATIENT
Start: 2019-05-01 | End: 2019-05-08

## 2019-05-01 ASSESSMENT — ENCOUNTER SYMPTOMS
EYE DISCHARGE: 0
NAUSEA: 0
ABDOMINAL DISTENTION: 0
SINUS PAIN: 0
SORE THROAT: 0
WHEEZING: 0
RHINORRHEA: 0
PHOTOPHOBIA: 0
EYE REDNESS: 0
SHORTNESS OF BREATH: 0
COLOR CHANGE: 0
SINUS PRESSURE: 0
EYE ITCHING: 0
CHEST TIGHTNESS: 0
COUGH: 0
ABDOMINAL PAIN: 0
TROUBLE SWALLOWING: 0
EYE PAIN: 0
BLOOD IN STOOL: 0
VOMITING: 0
CONSTIPATION: 0
DIARRHEA: 0
BACK PAIN: 0

## 2019-05-01 ASSESSMENT — PATIENT HEALTH QUESTIONNAIRE - PHQ9
1. LITTLE INTEREST OR PLEASURE IN DOING THINGS: 0
SUM OF ALL RESPONSES TO PHQ QUESTIONS 1-9: 0
2. FEELING DOWN, DEPRESSED OR HOPELESS: 0
SUM OF ALL RESPONSES TO PHQ QUESTIONS 1-9: 0
SUM OF ALL RESPONSES TO PHQ9 QUESTIONS 1 & 2: 0

## 2019-05-01 NOTE — PROGRESS NOTES
[facility]  [address] [address]  Dept: [phone]  Dept Fax: [phone]: [phone]     Visit Date:  5/1/2019      Patient:  Lolis Nunez  YOB: 1990    HPI:     Chief Complaint   Patient presents with    Diarrhea     x1 month    Flank Pain     pain in kidneys     Other     tenderness in groin    Urinary Frequency     frequency with burning        Symptoms started a month ago with diarrhea which continues with cramping/urgency and loose stools, occ has a solid one. The last 10 days he has had burning every void. 2 days ago his urethra was bright red. This is better. Flank pain started 3 weeks ago and is off and on. Last week more steady. Tender in parris area between scrotum and anis. Wife had yeast infection and they had sex. Wondering if this could be the cause. Was about a week ago. Medications    Current Outpatient Medications:     atorvastatin (LIPITOR) 20 MG tablet, Take 1 tablet by mouth daily, Disp: 30 tablet, Rfl: 5    cyclobenzaprine (FLEXERIL) 10 MG tablet, Take 10 mg by mouth 2 times daily as needed for Muscle spasms, Disp: , Rfl:     metoprolol tartrate (LOPRESSOR) 12.5 mg TABS, Take 12.5 mg by mouth 2 times daily, Disp: , Rfl:     aspirin EC 81 MG EC tablet, Take 1 tablet by mouth daily, Disp: 30 tablet, Rfl: 3    pantoprazole (PROTONIX) 40 MG tablet, Take 1 tablet by mouth daily, Disp: 30 tablet, Rfl: 0    vitamin D (ERGOCALCIFEROL) 01542 units CAPS capsule, Take 1 capsule by mouth once a week, Disp: 12 capsule, Rfl: 0    The patient is allergic to codeine and simvastatin. Past Medical History  Genny Romero  has a past medical history of Hyperlipidemia and Hypertension. Subjective:      Review of Systems   Constitutional: Negative. Negative for activity change, appetite change, fatigue, fever and unexpected weight change.    HENT: Negative for congestion, dental problem, ear pain, hearing loss, mouth sores, rhinorrhea, sinus pressure, sinus pain, sore throat and trouble swallowing. Eyes: Negative for photophobia, pain, discharge, redness, itching and visual disturbance. Respiratory: Negative for cough, chest tightness, shortness of breath and wheezing. Cardiovascular: Negative for chest pain, palpitations and leg swelling. Gastrointestinal: Negative for abdominal distention, abdominal pain (cramping and urgency for bm), blood in stool, constipation, diarrhea (stools are loose but not watery. Going 3-4 times a day usually twice a day. ), nausea and vomiting. Endocrine: Negative for cold intolerance, heat intolerance, polydipsia, polyphagia and polyuria. Genitourinary: Positive for dysuria, frequency (but normal amount of urine) and urgency. Negative for difficulty urinating and hematuria. Musculoskeletal: Negative for arthralgias, back pain, gait problem, joint swelling, myalgias, neck pain and neck stiffness. Skin: Negative for color change, pallor, rash and wound. Allergic/Immunologic: Negative for environmental allergies and food allergies. Neurological: Negative for dizziness, tremors, seizures, speech difficulty, weakness, numbness and headaches. Hematological: Negative for adenopathy. Does not bruise/bleed easily. Psychiatric/Behavioral: Negative for behavioral problems, confusion, decreased concentration and sleep disturbance. The patient is not hyperactive. Objective:     /82   Pulse 96   Resp 16   Wt 283 lb (128.4 kg)   SpO2 97%   BMI 40.61 kg/m²     Physical Exam   Constitutional: He is oriented to person, place, and time. He appears well-developed and well-nourished. HENT:   Head: Normocephalic and atraumatic. Right Ear: External ear normal.   Left Ear: External ear normal.   Nose: Nose normal.   Mouth/Throat: Oropharynx is clear and moist.   Eyes: Pupils are equal, round, and reactive to light.  Conjunctivae and EOM are normal. Neck: Normal range of motion. Neck supple. No tracheal deviation present. No thyromegaly present. Cardiovascular: Normal rate, regular rhythm, normal heart sounds and intact distal pulses. No murmur heard. Pulmonary/Chest: Effort normal and breath sounds normal. No respiratory distress. He has no wheezes. He has no rales. Abdominal: Soft. Bowel sounds are normal. He exhibits no distension and no mass. There is no tenderness. There is no guarding. Musculoskeletal: Normal range of motion. Lymphadenopathy:     He has no cervical adenopathy. Neurological: He is alert and oriented to person, place, and time. He has normal reflexes. No cranial nerve deficit. Skin: Skin is warm and dry. No rash noted. No erythema. Psychiatric: He has a normal mood and affect. His behavior is normal. Judgment and thought content normal.   Vitals reviewed. Assessment/Plan:      Carlos Johnson was seen today for diarrhea, flank pain, other and urinary frequency. Diagnoses and all orders for this visit:    Bilateral flank pain  -     POCT Urinalysis No Micro (Auto)  -     XR ABDOMEN (KUB) (SINGLE AP VIEW); Future    Hematuria, unspecified type  -     XR ABDOMEN (KUB) (SINGLE AP VIEW); Future        No follow-ups on file. Patient instructions given and reviewed.

## 2019-05-02 LAB
CHLAMYDIA TRACHOMATIS BY RT-PCR: NOT DETECTED
CT/NG SOURCE: NORMAL
NEISSERIA GONORRHOEAE BY RT-PCR: NOT DETECTED
RPR: NONREACTIVE

## 2019-05-04 LAB
HERPES TYPE 1/2 IGM COMBINED: 0.35 IV
HERPES TYPE I/II IGG COMBINED: 0.8 IV